# Patient Record
Sex: FEMALE | Race: AMERICAN INDIAN OR ALASKA NATIVE
[De-identification: names, ages, dates, MRNs, and addresses within clinical notes are randomized per-mention and may not be internally consistent; named-entity substitution may affect disease eponyms.]

---

## 2019-12-16 ENCOUNTER — HOSPITAL ENCOUNTER (INPATIENT)
Dept: HOSPITAL 5 - 3A | Age: 59
LOS: 7 days | Discharge: HOME HEALTH | DRG: 545 | End: 2019-12-23
Attending: INTERNAL MEDICINE | Admitting: INTERNAL MEDICINE
Payer: MEDICARE

## 2019-12-16 DIAGNOSIS — T45.515A: ICD-10-CM

## 2019-12-16 DIAGNOSIS — G89.4: ICD-10-CM

## 2019-12-16 DIAGNOSIS — M32.9: Primary | ICD-10-CM
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
## 2019-12-16 DIAGNOSIS — M19.90: ICD-10-CM

## 2019-12-16 DIAGNOSIS — R64: ICD-10-CM

## 2019-12-16 DIAGNOSIS — Z88.0: ICD-10-CM

## 2019-12-16 DIAGNOSIS — E43: ICD-10-CM

## 2019-12-16 DIAGNOSIS — Z86.718: ICD-10-CM

## 2019-12-16 DIAGNOSIS — Z91.013: ICD-10-CM

## 2019-12-16 DIAGNOSIS — Z88.6: ICD-10-CM

## 2019-12-16 DIAGNOSIS — E11.43: ICD-10-CM

## 2019-12-16 DIAGNOSIS — D64.9: ICD-10-CM

## 2019-12-16 DIAGNOSIS — K31.84: ICD-10-CM

## 2019-12-16 LAB
ALBUMIN SERPL-MCNC: 2.7 G/DL (ref 3.9–5)
ALT SERPL-CCNC: < 5 UNITS/L (ref 7–56)
BASOPHILS # (AUTO): 0 K/MM3 (ref 0–0.1)
BASOPHILS NFR BLD AUTO: 0.3 % (ref 0–1.8)
BUN SERPL-MCNC: 10 MG/DL (ref 7–17)
BUN/CREAT SERPL: 14 %
CALCIUM SERPL-MCNC: 8.7 MG/DL (ref 8.4–10.2)
EOSINOPHIL # BLD AUTO: 0.1 K/MM3 (ref 0–0.4)
EOSINOPHIL NFR BLD AUTO: 1.3 % (ref 0–4.3)
HCT VFR BLD CALC: 24.3 % (ref 30.3–42.9)
HEMOLYSIS INDEX: 13
HGB BLD-MCNC: 7.4 GM/DL (ref 10.1–14.3)
INR PPP: 8.32 (ref 0.87–1.13)
LYMPHOCYTES # BLD AUTO: 0.8 K/MM3 (ref 1.2–5.4)
LYMPHOCYTES NFR BLD AUTO: 11.6 % (ref 13.4–35)
MCHC RBC AUTO-ENTMCNC: 30 % (ref 30–34)
MCV RBC AUTO: 77 FL (ref 79–97)
MONOCYTES # (AUTO): 0.5 K/MM3 (ref 0–0.8)
MONOCYTES % (AUTO): 8.2 % (ref 0–7.3)
PLATELET # BLD: 277 K/MM3 (ref 140–440)
RBC # BLD AUTO: 3.17 M/MM3 (ref 3.65–5.03)

## 2019-12-16 PROCEDURE — 85025 COMPLETE CBC W/AUTO DIFF WBC: CPT

## 2019-12-16 PROCEDURE — 80053 COMPREHEN METABOLIC PANEL: CPT

## 2019-12-16 PROCEDURE — 85007 BL SMEAR W/DIFF WBC COUNT: CPT

## 2019-12-16 PROCEDURE — 85610 PROTHROMBIN TIME: CPT

## 2019-12-16 PROCEDURE — 82962 GLUCOSE BLOOD TEST: CPT

## 2019-12-16 PROCEDURE — 80048 BASIC METABOLIC PNL TOTAL CA: CPT

## 2019-12-16 PROCEDURE — 83540 ASSAY OF IRON: CPT

## 2019-12-16 PROCEDURE — 87116 MYCOBACTERIA CULTURE: CPT

## 2019-12-16 PROCEDURE — 36415 COLL VENOUS BLD VENIPUNCTURE: CPT

## 2019-12-16 PROCEDURE — A6250 SKIN SEAL PROTECT MOISTURIZR: HCPCS

## 2019-12-16 PROCEDURE — 74176 CT ABD & PELVIS W/O CONTRAST: CPT

## 2019-12-16 PROCEDURE — 82728 ASSAY OF FERRITIN: CPT

## 2019-12-16 RX ADMIN — MORPHINE SULFATE SCH: 30 TABLET, FILM COATED, EXTENDED RELEASE ORAL at 16:07

## 2019-12-16 RX ADMIN — LIDOCAINE HYDROCHLORIDE SCH ML: 20 SOLUTION ORAL; TOPICAL at 21:33

## 2019-12-16 RX ADMIN — HYDROMORPHONE HYDROCHLORIDE PRN MG: 1 INJECTION, SOLUTION INTRAMUSCULAR; INTRAVENOUS; SUBCUTANEOUS at 20:16

## 2019-12-16 RX ADMIN — DEXTROSE AND SODIUM CHLORIDE SCH MLS/HR: 5; .45 INJECTION, SOLUTION INTRAVENOUS at 17:02

## 2019-12-16 RX ADMIN — MORPHINE SULFATE SCH MG: 30 TABLET, FILM COATED, EXTENDED RELEASE ORAL at 21:37

## 2019-12-16 RX ADMIN — METHYLPREDNISOLONE SODIUM SUCCINATE SCH MG: 40 INJECTION, POWDER, FOR SOLUTION INTRAMUSCULAR; INTRAVENOUS at 21:33

## 2019-12-16 RX ADMIN — HYDROMORPHONE HYDROCHLORIDE PRN MG: 1 INJECTION, SOLUTION INTRAMUSCULAR; INTRAVENOUS; SUBCUTANEOUS at 16:12

## 2019-12-16 RX ADMIN — INSULIN HUMAN SCH UNITS: 100 INJECTION, SOLUTION PARENTERAL at 21:33

## 2019-12-16 RX ADMIN — LIDOCAINE HYDROCHLORIDE SCH: 20 SOLUTION ORAL; TOPICAL at 17:01

## 2019-12-16 RX ADMIN — Medication SCH ML: at 21:33

## 2019-12-16 RX ADMIN — INSULIN HUMAN SCH: 100 INJECTION, SOLUTION PARENTERAL at 17:01

## 2019-12-16 RX ADMIN — HYDROXYCHLOROQUINE SULFATE SCH MG: 200 TABLET ORAL at 21:33

## 2019-12-16 NOTE — HISTORY AND PHYSICAL REPORT
History of Present Illness


Date of examination: 12/16/19


Date of admission: 


12/16/19 13:56





Chief complaint: 





Generalized fatigue, intractable pain, Lupus flare.


History of present illness: 


Patient presented to the office , with CC of generalized weakness, diffuse joint

pains, in part, due to SLE flare up.Due to the fact, that patient was too weak, 

to manage this at home, she is now admitted to the hospital, for sxs 

management/control.She will get hydration, pain control, steroid.patient was on 

coumadin, for hx of DVT, and INR today, is overtherapeutic, and will be reversed

with Vit k, or FFP.








Past History


Past Medical History: anemia, arthritis, other (LUPUS.)


Social history: no significant social history, , lives with family


Family history: no significant family history





Medications and Allergies


                                    Allergies











Allergy/AdvReac Type Severity Reaction Status Date / Time


 


Penicillins Allergy Severe Shortness Verified 03/12/16 12:54





   of Breath  


 


codeine Allergy  Unknown Verified 03/12/16 12:54


 


shellfish derived Allergy  Unknown Verified 03/12/16 12:54











                                Home Medications











 Medication  Instructions  Recorded  Confirmed  Last Taken  Type


 


Hydroxychloroquine [Plaquenil] 200 mg PO BID 10/15/13 10/17/18 10/16/18 History


 


Metoprolol [Lopressor TAB] 100 mg PO BID 10/15/13 10/17/18 10/16/18 History


 


Sertraline [Zoloft] 100 mg PO QDAY 10/15/13 10/17/18 10/16/18 History


 


hydroCHLOROthiazide [HCTZ] 12.5 mg PO QDAY 10/15/13 10/17/18 10/16/18 History


 


Pantoprazole [Protonix TAB] 40 mg PO QDAY 02/20/14 10/17/18 10/16/18 History


 


Potassium Chloride [K-Dur] 10 meq PO DAILY 02/20/14 10/17/18 10/16/18 History


 


Metoclopramide [Reglan TAB] 10 mg PO TID #60 tablet 06/06/14 10/17/18 10/16/18 

Rx


 


Gabapentin [Gralise] 300 mg PO TID 03/04/16 10/17/18 10/16/18 History


 


Morphine ER [Ms Contin ER] 30 mg PO BID 03/04/16 10/17/18 10/16/18 History


 


Oxycodone HCl/Acetaminophen 10 mg PO Q6H PRN 03/04/16 10/17/18 10/16/18 History





[Percocet 10/325 mg]     


 


Ondansetron [Zofran Odt] 4 mg PO Q8H PRN #10 tab.rapdis 08/09/16 10/17/18 

10/16/18 Rx


 


oxyCODONE /ACETAMINOPHEN [Percocet 1 tab PO Q6HR PRN #20 tablet 12/16/16 

10/17/18 10/16/18 Rx





5/325]     


 


predniSONE [Deltasone] 15 mg PO DAILY 05/31/17 10/17/18 10/16/18 History


 


Warfarin [Coumadin] 5 mg PO QDAY 06/01/17 10/17/18 10/16/18 History


 


Oxycodone HCl/Acetaminophen 1 each PO Q6HR PRN #60 tablet 06/07/17 10/17/18 

10/16/18 Rx





[Percocet 10/325 mg]     











Active Meds: 


Active Medications





Dextrose (D50w (25gm) Syringe)  50 ml IV Q30MIN PRN; Protocol


   PRN Reason: Hypoglycemia


Folic Acid (Folvite)  1 mg PO QDAY MONIQUE


Hydromorphone HCl (Dilaudid)  2 mg IV Q4H PRN


   PRN Reason: Pain , Severe (7-10)


Hydroxychloroquine Sulfate (Plaquenil)  200 mg PO BID [facility]


Dextrose/Sodium Chloride (D5/0.45ns)  1,000 mls @ 125 mls/hr IV AS DIRECT MONIQUE


   Last Admin: 12/16/19 17:02 Dose:  125 mls/hr


   Documented by: 


Insulin Human Regular (Humulin R)  0 units SUB-Q ACHS [facility]; Protocol


   Last Admin: 12/16/19 17:01 Dose:  Not Given


   Documented by: 


Lidocaine HCl (Magic Mouthwash)  5 ml PO TID [facility]


   Last Admin: 12/16/19 17:01 Dose:  Not Given


   Documented by: 


Methylprednisolone Sodium Succinate (Solu-Medrol)  40 mg IV Q8HR [facility]


Metoclopramide HCl (Reglan)  10 mg PO TID [facility]


Miscellaneous Medication (Gabapentin [Gralise])  300 mg PO TID [facility]


Morphine Sulfate (Ms Contin Er)  30 mg PO Q12HR [facility]


   Last Admin: 12/16/19 16:07 Dose:  Not Given


   Documented by: 


Pantoprazole Sodium (Protonix)  40 mg PO QDAY MONIQUE


Sertraline HCl (Zoloft)  100 mg PO QDAY MONIQUE


Sodium Chloride (Sodium Chloride Flush Syringe 10 Ml)  10 ml IV BID MONIQUE


Sodium Chloride (Sodium Chloride Flush Syringe 10 Ml)  10 ml IV PRN PRN


   PRN Reason: LINE FLUSH











Review of Systems


Constitutional: weight loss, fatigue, weakness, poor appetite, chronic pain


Breasts: deferred


Psychiatric: anxiety, insomnia, depression





Exam





- Constitutional


Vitals: 


                                        











Temp Pulse Resp BP Pulse Ox


 


 98.3 F   80   19   113/76   96 


 


 12/16/19 16:31  12/16/19 16:31  12/16/19 16:31  12/16/19 16:31  12/16/19 16:31











General appearance: Present: mild distress, cachectic





- EENT


Eyes: Present: PERRL


ENT: hearing intact, clear oral mucosa





- Neck


Neck: Present: supple, normal ROM





- Respiratory


Respiratory effort: normal


Respiratory: bilateral: CTA





- Cardiovascular


Heart Sounds: Present: S1 & S2.  Absent: rub, click





- Extremities


Extremities: pulses symmetrical, No edema


Peripheral Pulses: within normal limits





- Abdominal


General gastrointestinal: Present: soft, non-tender, non-distended, normal bowel

 sounds


Female genitourinary: Present: deferred





- Rectal


Rectal Exam: deferred





- Integumentary


Integumentary: Present: clear, warm, dry





- Musculoskeletal


Musculoskeletal: gait normal, strength equal bilaterally





- Psychiatric


Psychiatric: appropriate mood/affect, intact judgment & insight





- Neurologic


Neurologic: CNII-XII intact, moves all extremities





Results





- Labs


CBC & Chem 7: 


                                 12/16/19 15:08





                                 12/16/19 15:08


Labs: 


                              Abnormal lab results











  12/16/19 12/16/19 12/16/19 Range/Units





  15:08 15:08 15:08 


 


RBC  3.17 L    (3.65-5.03)  M/mm3


 


Hgb  7.4 L    (10.1-14.3)  gm/dl


 


Hct  24.3 L    (30.3-42.9)  %


 


MCV  77 L    (79-97)  fl


 


MCH  23 L    (28-32)  pg


 


RDW  20.5 H    (13.2-15.2)  %


 


Lymph % (Auto)  11.6 L    (13.4-35.0)  %


 


Mono % (Auto)  8.2 H    (0.0-7.3)  %


 


Lymph #  0.8 L    (1.2-5.4)  K/mm3


 


Seg Neutrophils %  78.6 H    (40.0-70.0)  %


 


PT    71.0 H  (12.2-14.9)  Sec.


 


INR    8.32 H*  (0.87-1.13)  


 


Chloride   107.4 H   ()  mmol/L


 


ALT   < 5 L   (7-56)  units/L


 


Albumin   2.7 L   (3.9-5)  g/dL














Assessment and Plan





- Patient Problems


(1) Chronic pain syndrome


Current Visit: No   Status: Chronic   


Plan to address problem: 


pain control.








(2) Exacerbation of systemic lupus erythematosus


Current Visit: No   Status: Acute   


Plan to address problem: 


pulse steroid use.








(3) Gastroparesis diabeticorum


Current Visit: No   Status: Chronic   


Plan to address problem: 


supportive.








(4) Coumadin toxicity


Current Visit: Yes   Status: Acute   


Plan to address problem: 


will reverse.

## 2019-12-16 NOTE — CAT SCAN REPORT
CT abdomen pelvis wo con



INDICATION:

PELVIC PAIN.



TECHNIQUE:

All CT scans at this location are performed using CT dose reduction for ALARA by means of automated e
xposure control. 



COMPARISON:

3/5/2016



FINDINGS:

Lung bases are clear of acute disease. Moderate-sized hiatal hernia. Cholecystectomy. Liver, spleen, 
pancreas, kidneys and adrenals are grossly negative on this noncontrast exam. IVC filter in position.
 Abdominal aorta is normal in size. No adenopathy.



Pelvis



Urinary bladder and distal ureters are negative. Uterus is absent. 2.3 cm left ovarian cyst. No free 
fluid.



IMPRESSION:

1. 2.3 cm left ovarian cyst.

2. Otherwise, no acute abnormalities. 



Signer Name: Jim Marshall MD 

Signed: 12/16/2019 7:31 PM

 Workstation Name: [id number]

## 2019-12-17 LAB — INR PPP: 1.7 (ref 0.87–1.13)

## 2019-12-17 RX ADMIN — INSULIN HUMAN SCH UNITS: 100 INJECTION, SOLUTION PARENTERAL at 17:13

## 2019-12-17 RX ADMIN — DEXTROSE AND SODIUM CHLORIDE SCH MLS/HR: 5; .45 INJECTION, SOLUTION INTRAVENOUS at 20:41

## 2019-12-17 RX ADMIN — METHYLPREDNISOLONE SODIUM SUCCINATE SCH MG: 40 INJECTION, POWDER, FOR SOLUTION INTRAMUSCULAR; INTRAVENOUS at 13:02

## 2019-12-17 RX ADMIN — LIDOCAINE HYDROCHLORIDE SCH ML: 20 SOLUTION ORAL; TOPICAL at 20:42

## 2019-12-17 RX ADMIN — LIDOCAINE HYDROCHLORIDE SCH ML: 20 SOLUTION ORAL; TOPICAL at 08:58

## 2019-12-17 RX ADMIN — HYDROMORPHONE HYDROCHLORIDE PRN MG: 2 INJECTION, SOLUTION INTRAMUSCULAR; INTRAVENOUS; SUBCUTANEOUS at 01:24

## 2019-12-17 RX ADMIN — METOCLOPRAMIDE SCH MG: 10 TABLET ORAL at 08:57

## 2019-12-17 RX ADMIN — HYDROMORPHONE HYDROCHLORIDE PRN MG: 2 INJECTION, SOLUTION INTRAMUSCULAR; INTRAVENOUS; SUBCUTANEOUS at 22:15

## 2019-12-17 RX ADMIN — LIDOCAINE HYDROCHLORIDE SCH ML: 20 SOLUTION ORAL; TOPICAL at 13:03

## 2019-12-17 RX ADMIN — METOCLOPRAMIDE SCH MG: 10 TABLET ORAL at 20:43

## 2019-12-17 RX ADMIN — HYDROMORPHONE HYDROCHLORIDE PRN MG: 2 INJECTION, SOLUTION INTRAMUSCULAR; INTRAVENOUS; SUBCUTANEOUS at 05:56

## 2019-12-17 RX ADMIN — MORPHINE SULFATE SCH MG: 30 TABLET, FILM COATED, EXTENDED RELEASE ORAL at 09:17

## 2019-12-17 RX ADMIN — METHYLPREDNISOLONE SODIUM SUCCINATE SCH MG: 40 INJECTION, POWDER, FOR SOLUTION INTRAMUSCULAR; INTRAVENOUS at 05:56

## 2019-12-17 RX ADMIN — HYDROXYCHLOROQUINE SULFATE SCH MG: 200 TABLET ORAL at 21:11

## 2019-12-17 RX ADMIN — DEXTROSE AND SODIUM CHLORIDE SCH MLS/HR: 5; .45 INJECTION, SOLUTION INTRAVENOUS at 03:30

## 2019-12-17 RX ADMIN — SERTRALINE SCH MG: 100 TABLET, FILM COATED ORAL at 09:17

## 2019-12-17 RX ADMIN — HYDROMORPHONE HYDROCHLORIDE PRN MG: 2 INJECTION, SOLUTION INTRAMUSCULAR; INTRAVENOUS; SUBCUTANEOUS at 17:13

## 2019-12-17 RX ADMIN — GABAPENTIN SCH MG: 300 CAPSULE ORAL at 13:03

## 2019-12-17 RX ADMIN — PANTOPRAZOLE SODIUM SCH MG: 40 TABLET, DELAYED RELEASE ORAL at 09:18

## 2019-12-17 RX ADMIN — INSULIN HUMAN SCH: 100 INJECTION, SOLUTION PARENTERAL at 22:16

## 2019-12-17 RX ADMIN — METHYLPREDNISOLONE SODIUM SUCCINATE SCH MG: 40 INJECTION, POWDER, FOR SOLUTION INTRAMUSCULAR; INTRAVENOUS at 21:11

## 2019-12-17 RX ADMIN — MORPHINE SULFATE SCH MG: 30 TABLET, FILM COATED, EXTENDED RELEASE ORAL at 21:11

## 2019-12-17 RX ADMIN — Medication SCH ML: at 09:20

## 2019-12-17 RX ADMIN — DEXTROSE AND SODIUM CHLORIDE SCH MLS/HR: 5; .45 INJECTION, SOLUTION INTRAVENOUS at 12:20

## 2019-12-17 RX ADMIN — HYDROXYCHLOROQUINE SULFATE SCH MG: 200 TABLET ORAL at 09:18

## 2019-12-17 RX ADMIN — GABAPENTIN SCH MG: 300 CAPSULE ORAL at 20:43

## 2019-12-17 RX ADMIN — INSULIN HUMAN SCH UNITS: 100 INJECTION, SOLUTION PARENTERAL at 08:57

## 2019-12-17 RX ADMIN — GABAPENTIN SCH MG: 300 CAPSULE ORAL at 08:57

## 2019-12-17 RX ADMIN — FOLIC ACID SCH MG: 1 TABLET ORAL at 09:17

## 2019-12-17 RX ADMIN — METOCLOPRAMIDE SCH MG: 10 TABLET ORAL at 13:03

## 2019-12-17 RX ADMIN — INSULIN HUMAN SCH UNITS: 100 INJECTION, SOLUTION PARENTERAL at 13:02

## 2019-12-17 RX ADMIN — Medication SCH ML: at 21:12

## 2019-12-17 RX ADMIN — HYDROMORPHONE HYDROCHLORIDE PRN MG: 2 INJECTION, SOLUTION INTRAMUSCULAR; INTRAVENOUS; SUBCUTANEOUS at 10:29

## 2019-12-17 NOTE — PROGRESS NOTE
Assessment and Plan





- Patient Problems


(1) Chronic pain syndrome


Current Visit: No   Status: Chronic   


Plan to address problem: 


pain control.








(2) Exacerbation of systemic lupus erythematosus


Current Visit: Yes   Status: Acute   


Plan to address problem: 


pulse steroid use.








(3) Gastroparesis diabeticorum


Current Visit: Yes   Status: Chronic   


Plan to address problem: 


supportive.








(4) Coumadin toxicity


Current Visit: Yes   Status: Acute   


Plan to address problem: 


will reverse.


reversed.








(5) Cachexia


Current Visit: Yes   Status: Acute   


Plan to address problem: 


with protien calore malnurishment, present on admission, and will continue with 

support.








Subjective


Date of service: 12/17/19


Principal diagnosis: Dehydration/generalized weakness,poor nutrition.


Interval history: 


Patient seen/examined, resting in bed, labs reviewed, case d/w patient.Will 

continue with current management.








Objective





- Constitutional


Vitals: 


                               Vital Signs - 12hr











  12/17/19 12/17/19 12/17/19





  10:00 11:51 16:47


 


Temperature  98.3 F 98.4 F


 


Pulse Rate  64 75


 


Respiratory  18 18





Rate   


 


Blood Pressure  111/70 117/76


 


O2 Sat by Pulse 96 95 94





Oximetry   











General appearance: Present: mild distress, cachectic, other (protien calore 

malnurishment with cachectia, present on admission.)





- EENT


Eyes: PERRL, EOM intact


ENT: hearing intact, clear oral mucosa


Ears: bilateral: normal





- Neck


Neck: supple, normal ROM





- Respiratory


Respiratory effort: normal


Respiratory: bilateral: CTA





- Breasts


Breasts: deferred





- Cardiovascular


Rhythm: regular


Heart Sounds: Present: S1 & S2.  Absent: gallop, rub


Extremities: pulses intact, No edema, normal color, Full ROM





- Gastrointestinal


General gastrointestinal: Present: soft, non-tender, non-distended, normal bowel

sounds


Rectal Exam: deferred





- Genitourinary


Female genitourinary: deferred





- Integumentary


Integumentary: clear, warm, dry





- Musculoskeletal


Musculoskeletal: 1, strength equal bilaterally





- Neurologic


Neurologic: moves all extremities





- Psychiatric


Psychiatric: memory intact, appropriate mood/affect, intact judgment & insight





- Labs


CBC & Chem 7: 


                                 12/16/19 15:08





                                 12/16/19 15:08


Labs: 


                              Abnormal lab results











  12/16/19 12/17/19 12/17/19 Range/Units





  21:19 06:00 06:00 


 


PT   20.3 H   (12.2-14.9)  Sec.


 


INR   1.70 H   (0.87-1.13)  


 


POC Glucose  172 H    ()  


 


Iron    14 L  ()  ug/dL














  12/17/19 12/17/19 12/17/19 Range/Units





  07:36 11:47 16:39 


 


PT     (12.2-14.9)  Sec.


 


INR     (0.87-1.13)  


 


POC Glucose  253 H  165 H  172 H  ()  


 


Iron     ()  ug/dL

## 2019-12-18 LAB — INR PPP: 1.44 (ref 0.87–1.13)

## 2019-12-18 RX ADMIN — METHYLPREDNISOLONE SODIUM SUCCINATE SCH MG: 40 INJECTION, POWDER, FOR SOLUTION INTRAMUSCULAR; INTRAVENOUS at 14:12

## 2019-12-18 RX ADMIN — HYDROMORPHONE HYDROCHLORIDE PRN MG: 2 INJECTION, SOLUTION INTRAMUSCULAR; INTRAVENOUS; SUBCUTANEOUS at 09:00

## 2019-12-18 RX ADMIN — SODIUM CHLORIDE SCH MLS/HR: 0.45 INJECTION, SOLUTION INTRAVENOUS at 22:46

## 2019-12-18 RX ADMIN — HYDROMORPHONE HYDROCHLORIDE PRN MG: 2 INJECTION, SOLUTION INTRAMUSCULAR; INTRAVENOUS; SUBCUTANEOUS at 04:01

## 2019-12-18 RX ADMIN — DEXTROSE AND SODIUM CHLORIDE SCH MLS/HR: 5; .45 INJECTION, SOLUTION INTRAVENOUS at 04:01

## 2019-12-18 RX ADMIN — MORPHINE SULFATE SCH MG: 30 TABLET, FILM COATED, EXTENDED RELEASE ORAL at 12:43

## 2019-12-18 RX ADMIN — LIDOCAINE HYDROCHLORIDE SCH ML: 20 SOLUTION ORAL; TOPICAL at 09:00

## 2019-12-18 RX ADMIN — INSULIN HUMAN SCH UNITS: 100 INJECTION, SOLUTION PARENTERAL at 22:46

## 2019-12-18 RX ADMIN — HYDROMORPHONE HYDROCHLORIDE PRN MG: 2 INJECTION, SOLUTION INTRAMUSCULAR; INTRAVENOUS; SUBCUTANEOUS at 18:10

## 2019-12-18 RX ADMIN — GABAPENTIN SCH MG: 300 CAPSULE ORAL at 21:03

## 2019-12-18 RX ADMIN — HYDROMORPHONE HYDROCHLORIDE PRN MG: 2 INJECTION, SOLUTION INTRAMUSCULAR; INTRAVENOUS; SUBCUTANEOUS at 22:45

## 2019-12-18 RX ADMIN — INSULIN HUMAN SCH UNITS: 100 INJECTION, SOLUTION PARENTERAL at 12:40

## 2019-12-18 RX ADMIN — METOCLOPRAMIDE SCH MG: 10 TABLET ORAL at 09:00

## 2019-12-18 RX ADMIN — Medication SCH ML: at 21:04

## 2019-12-18 RX ADMIN — FOLIC ACID SCH MG: 1 TABLET ORAL at 12:50

## 2019-12-18 RX ADMIN — METOCLOPRAMIDE SCH MG: 10 TABLET ORAL at 21:03

## 2019-12-18 RX ADMIN — INSULIN HUMAN SCH UNITS: 100 INJECTION, SOLUTION PARENTERAL at 18:08

## 2019-12-18 RX ADMIN — LIDOCAINE HYDROCHLORIDE SCH ML: 20 SOLUTION ORAL; TOPICAL at 22:45

## 2019-12-18 RX ADMIN — MORPHINE SULFATE SCH MG: 30 TABLET, FILM COATED, EXTENDED RELEASE ORAL at 21:04

## 2019-12-18 RX ADMIN — GABAPENTIN SCH MG: 300 CAPSULE ORAL at 14:12

## 2019-12-18 RX ADMIN — LIDOCAINE HYDROCHLORIDE SCH ML: 20 SOLUTION ORAL; TOPICAL at 14:12

## 2019-12-18 RX ADMIN — METHYLPREDNISOLONE SODIUM SUCCINATE SCH MG: 40 INJECTION, POWDER, FOR SOLUTION INTRAMUSCULAR; INTRAVENOUS at 21:04

## 2019-12-18 RX ADMIN — GABAPENTIN SCH MG: 300 CAPSULE ORAL at 08:59

## 2019-12-18 RX ADMIN — INSULIN HUMAN SCH UNITS: 100 INJECTION, SOLUTION PARENTERAL at 08:54

## 2019-12-18 RX ADMIN — METOCLOPRAMIDE SCH MG: 10 TABLET ORAL at 14:12

## 2019-12-18 RX ADMIN — HYDROMORPHONE HYDROCHLORIDE PRN MG: 2 INJECTION, SOLUTION INTRAMUSCULAR; INTRAVENOUS; SUBCUTANEOUS at 14:11

## 2019-12-18 RX ADMIN — HYDROXYCHLOROQUINE SULFATE SCH MG: 200 TABLET ORAL at 12:43

## 2019-12-18 RX ADMIN — Medication SCH ML: at 12:51

## 2019-12-18 RX ADMIN — SERTRALINE SCH MG: 100 TABLET, FILM COATED ORAL at 12:42

## 2019-12-18 RX ADMIN — HYDROXYCHLOROQUINE SULFATE SCH MG: 200 TABLET ORAL at 21:03

## 2019-12-18 RX ADMIN — PANTOPRAZOLE SODIUM SCH MG: 40 TABLET, DELAYED RELEASE ORAL at 12:42

## 2019-12-18 RX ADMIN — METHYLPREDNISOLONE SODIUM SUCCINATE SCH MG: 40 INJECTION, POWDER, FOR SOLUTION INTRAMUSCULAR; INTRAVENOUS at 06:01

## 2019-12-18 NOTE — PROGRESS NOTE
Assessment and Plan





- Patient Problems


(1) Chronic pain syndrome


Current Visit: No   Status: Chronic   


Plan to address problem: 


pain control.








(2) Exacerbation of systemic lupus erythematosus


Current Visit: Yes   Status: Acute   


Plan to address problem: 


pulse steroid use.








(3) Gastroparesis diabeticorum


Current Visit: Yes   Status: Chronic   


Plan to address problem: 


supportive.








(4) Coumadin toxicity


Current Visit: Yes   Status: Acute   


Plan to address problem: 


will reverse.


reversed.








(5) Cachexia


Current Visit: Yes   Status: Acute   


Plan to address problem: 


with eliazar joye malnurishment, present on admission, and will continue with 

support.








Subjective


Date of service: 12/18/19


Principal diagnosis: Dehydration/generalized weakness,poor nutrition.


Interval history: 


Patient seen/examined, resting in bed, labs reviewed, case d/w patient.Will 

continue with current management.


Patient seen/examined, resting in bed,, still in pain, and weakness.





Objective





- Constitutional


Vitals: 


                               Vital Signs - 12hr











  12/18/19 12/18/19





  08:38 11:01


 


Temperature  97.3 F L


 


Pulse Rate  63


 


Respiratory  20





Rate  


 


Blood Pressure  111/71


 


O2 Sat by Pulse 94 95





Oximetry  











General appearance: Present: mild distress, cachectic





- EENT


Eyes: PERRL, EOM intact


ENT: hearing intact, clear oral mucosa


Ears: bilateral: normal





- Neck


Neck: supple, normal ROM





- Respiratory


Respiratory effort: normal


Respiratory: bilateral: CTA





- Breasts


Breasts: deferred





- Cardiovascular


Rhythm: regular


Heart Sounds: Present: S1 & S2.  Absent: gallop, rub


Extremities: pulses intact, No edema, normal color, Full ROM





- Gastrointestinal


General gastrointestinal: Present: soft, non-tender, non-distended, normal bowel

sounds


Rectal Exam: deferred





- Genitourinary


Female genitourinary: deferred





- Integumentary


Integumentary: clear, warm, dry





- Musculoskeletal


Musculoskeletal: 1, strength equal bilaterally





- Neurologic


Neurologic: moves all extremities





- Psychiatric


Psychiatric: memory intact, appropriate mood/affect, intact judgment & insight





- Labs


CBC & Chem 7: 


                                 12/16/19 15:08





                                 12/16/19 15:08


Labs: 


                              Abnormal lab results











  12/17/19 12/18/19 12/18/19 Range/Units





  21:34 08:20 09:14 


 


PT    17.8 H  (12.2-14.9)  Sec.


 


INR    1.44 H  (0.87-1.13)  


 


POC Glucose  237 H  404 H   ()  














  12/18/19 12/18/19 Range/Units





  11:21 16:57 


 


PT    (12.2-14.9)  Sec.


 


INR    (0.87-1.13)  


 


POC Glucose  163 H  182 H  ()

## 2019-12-19 LAB — INR PPP: 2.16 (ref 0.87–1.13)

## 2019-12-19 RX ADMIN — INSULIN HUMAN SCH UNITS: 100 INJECTION, SOLUTION PARENTERAL at 18:00

## 2019-12-19 RX ADMIN — Medication PRN ML: at 03:31

## 2019-12-19 RX ADMIN — MORPHINE SULFATE SCH MG: 30 TABLET, FILM COATED, EXTENDED RELEASE ORAL at 21:28

## 2019-12-19 RX ADMIN — HYDROMORPHONE HYDROCHLORIDE PRN MG: 2 INJECTION, SOLUTION INTRAMUSCULAR; INTRAVENOUS; SUBCUTANEOUS at 19:50

## 2019-12-19 RX ADMIN — LIDOCAINE HYDROCHLORIDE SCH ML: 20 SOLUTION ORAL; TOPICAL at 13:32

## 2019-12-19 RX ADMIN — INSULIN HUMAN SCH UNITS: 100 INJECTION, SOLUTION PARENTERAL at 13:12

## 2019-12-19 RX ADMIN — INSULIN HUMAN SCH: 100 INJECTION, SOLUTION PARENTERAL at 08:13

## 2019-12-19 RX ADMIN — METHYLPREDNISOLONE SODIUM SUCCINATE SCH MG: 40 INJECTION, POWDER, FOR SOLUTION INTRAMUSCULAR; INTRAVENOUS at 21:29

## 2019-12-19 RX ADMIN — MORPHINE SULFATE SCH MG: 30 TABLET, FILM COATED, EXTENDED RELEASE ORAL at 10:05

## 2019-12-19 RX ADMIN — Medication SCH ML: at 10:05

## 2019-12-19 RX ADMIN — FOLIC ACID SCH MG: 1 TABLET ORAL at 10:05

## 2019-12-19 RX ADMIN — LIDOCAINE HYDROCHLORIDE SCH ML: 20 SOLUTION ORAL; TOPICAL at 08:28

## 2019-12-19 RX ADMIN — SODIUM CHLORIDE SCH MLS/HR: 0.45 INJECTION, SOLUTION INTRAVENOUS at 21:26

## 2019-12-19 RX ADMIN — METOCLOPRAMIDE SCH MG: 10 TABLET ORAL at 19:55

## 2019-12-19 RX ADMIN — SODIUM CHLORIDE SCH MLS/HR: 0.45 INJECTION, SOLUTION INTRAVENOUS at 13:11

## 2019-12-19 RX ADMIN — Medication SCH ML: at 21:28

## 2019-12-19 RX ADMIN — SERTRALINE SCH MG: 100 TABLET, FILM COATED ORAL at 10:05

## 2019-12-19 RX ADMIN — LIDOCAINE HYDROCHLORIDE SCH ML: 20 SOLUTION ORAL; TOPICAL at 19:55

## 2019-12-19 RX ADMIN — HYDROXYCHLOROQUINE SULFATE SCH MG: 200 TABLET ORAL at 10:05

## 2019-12-19 RX ADMIN — GABAPENTIN SCH MG: 300 CAPSULE ORAL at 08:28

## 2019-12-19 RX ADMIN — HYDROMORPHONE HYDROCHLORIDE PRN MG: 2 INJECTION, SOLUTION INTRAMUSCULAR; INTRAVENOUS; SUBCUTANEOUS at 07:02

## 2019-12-19 RX ADMIN — HYDROMORPHONE HYDROCHLORIDE PRN MG: 2 INJECTION, SOLUTION INTRAMUSCULAR; INTRAVENOUS; SUBCUTANEOUS at 11:20

## 2019-12-19 RX ADMIN — GABAPENTIN SCH MG: 300 CAPSULE ORAL at 19:54

## 2019-12-19 RX ADMIN — SODIUM CHLORIDE SCH MLS/HR: 0.45 INJECTION, SOLUTION INTRAVENOUS at 05:48

## 2019-12-19 RX ADMIN — Medication PRN ML: at 05:48

## 2019-12-19 RX ADMIN — METHYLPREDNISOLONE SODIUM SUCCINATE SCH MG: 40 INJECTION, POWDER, FOR SOLUTION INTRAMUSCULAR; INTRAVENOUS at 13:12

## 2019-12-19 RX ADMIN — METHYLPREDNISOLONE SODIUM SUCCINATE SCH MG: 40 INJECTION, POWDER, FOR SOLUTION INTRAMUSCULAR; INTRAVENOUS at 05:48

## 2019-12-19 RX ADMIN — PANTOPRAZOLE SODIUM SCH MG: 40 TABLET, DELAYED RELEASE ORAL at 10:04

## 2019-12-19 RX ADMIN — INSULIN HUMAN SCH UNITS: 100 INJECTION, SOLUTION PARENTERAL at 22:56

## 2019-12-19 RX ADMIN — HYDROXYCHLOROQUINE SULFATE SCH MG: 200 TABLET ORAL at 21:29

## 2019-12-19 RX ADMIN — METOCLOPRAMIDE SCH MG: 10 TABLET ORAL at 08:28

## 2019-12-19 RX ADMIN — HYDROMORPHONE HYDROCHLORIDE PRN MG: 2 INJECTION, SOLUTION INTRAMUSCULAR; INTRAVENOUS; SUBCUTANEOUS at 03:31

## 2019-12-19 RX ADMIN — HYDROMORPHONE HYDROCHLORIDE PRN MG: 2 INJECTION, SOLUTION INTRAMUSCULAR; INTRAVENOUS; SUBCUTANEOUS at 15:37

## 2019-12-19 RX ADMIN — GABAPENTIN SCH MG: 300 CAPSULE ORAL at 13:12

## 2019-12-19 RX ADMIN — METOCLOPRAMIDE SCH MG: 10 TABLET ORAL at 13:12

## 2019-12-19 NOTE — PROGRESS NOTE
Assessment and Plan





- Patient Problems


(1) Chronic pain syndrome


Current Visit: No   Status: Chronic   


Plan to address problem: 


pain control.








(2) Exacerbation of systemic lupus erythematosus


Current Visit: Yes   Status: Acute   


Plan to address problem: 


pulse steroid use.








(3) Gastroparesis diabeticorum


Current Visit: Yes   Status: Chronic   


Plan to address problem: 


supportive.








(4) Coumadin toxicity


Current Visit: Yes   Status: Acute   


Plan to address problem: 


will reverse.


reversed.








(5) Cachexia


Current Visit: Yes   Status: Acute   


Plan to address problem: 


with protien calore malnurishment, present on admission, and will continue with 

support.








Subjective


Date of service: 12/19/19


Principal diagnosis: Dehydration/generalized weakness,poor nutrition.


Interval history: 


Patient seen/examined, resting in bed, labs reviewed, case d/w patient.Will 

continue with current management.


Patient seen/examined, resting in bed,, still in pain, and weakness.


patient seen/examined, resting in bed, records reviewed, case d/w patient. Ct of

the abd/pelvics with ovarian cystic lesion.patient c/o slight improvement , 

overall.will continue , with current management , for couple more days, and 

hernan down steroid, as she prepares to go home.





Objective





- Constitutional


Vitals: 


                               Vital Signs - 12hr











  12/19/19 12/19/19 12/19/19





  11:37 17:25 21:33


 


Temperature 98.3 F 98.3 F 


 


Pulse Rate 65 70 


 


Respiratory 22 22 





Rate   


 


Blood Pressure 127/81 107/69 


 


O2 Sat by Pulse 94 95 95





Oximetry   











General appearance: Present: mild distress, cachectic





- EENT


Eyes: PERRL, EOM intact


ENT: hearing intact, clear oral mucosa


Ears: bilateral: normal





- Neck


Neck: supple, normal ROM





- Respiratory


Respiratory effort: normal


Respiratory: bilateral: CTA





- Breasts


Breasts: deferred





- Cardiovascular


Rhythm: regular


Heart Sounds: Present: S1 & S2.  Absent: gallop, rub


Extremities: pulses intact, No edema, normal color, Full ROM





- Gastrointestinal


General gastrointestinal: Present: soft, non-tender, non-distended, normal bowel

sounds


Rectal Exam: deferred





- Genitourinary


Female genitourinary: deferred





- Integumentary


Integumentary: clear, warm, dry





- Musculoskeletal


Musculoskeletal: 1, strength equal bilaterally





- Neurologic


Neurologic: moves all extremities





- Psychiatric


Psychiatric: memory intact, appropriate mood/affect, intact judgment & insight





- Labs


CBC & Chem 7: 


                                 12/16/19 15:08





                                 12/16/19 15:08


Labs: 


                              Abnormal lab results











  12/18/19 12/19/19 12/19/19 Range/Units





  22:05 06:05 08:06 


 


PT   24.5 H   (12.2-14.9)  Sec.


 


INR   2.16 H   (0.87-1.13)  


 


POC Glucose  167 H   128 H  ()  














  12/19/19 12/19/19 Range/Units





  11:45 17:37 


 


PT    (12.2-14.9)  Sec.


 


INR    (0.87-1.13)  


 


POC Glucose  250 H  169 H  ()

## 2019-12-20 LAB
BAND NEUTROPHILS # (MANUAL): 0 K/MM3
BUN SERPL-MCNC: 26 MG/DL (ref 7–17)
BUN/CREAT SERPL: 37 %
CALCIUM SERPL-MCNC: 7.8 MG/DL (ref 8.4–10.2)
DACRYOCYTES BLD QL SMEAR: (no result)
HCT VFR BLD CALC: 29.3 % (ref 30.3–42.9)
HEMOLYSIS INDEX: 1
HGB BLD-MCNC: 9 GM/DL (ref 10.1–14.3)
INR PPP: 2.92 (ref 0.87–1.13)
MCHC RBC AUTO-ENTMCNC: 31 % (ref 30–34)
MCV RBC AUTO: 78 FL (ref 79–97)
MYELOCYTES # (MANUAL): 0 K/MM3
PLATELET # BLD: 299 K/MM3 (ref 140–440)
PROMYELOCYTES # (MANUAL): 0 K/MM3
RBC # BLD AUTO: 3.76 M/MM3 (ref 3.65–5.03)
TOTAL CELLS COUNTED BLD: 100

## 2019-12-20 RX ADMIN — INSULIN HUMAN SCH: 100 INJECTION, SOLUTION PARENTERAL at 22:37

## 2019-12-20 RX ADMIN — GABAPENTIN SCH MG: 300 CAPSULE ORAL at 21:29

## 2019-12-20 RX ADMIN — HYDROMORPHONE HYDROCHLORIDE PRN MG: 2 INJECTION, SOLUTION INTRAMUSCULAR; INTRAVENOUS; SUBCUTANEOUS at 04:06

## 2019-12-20 RX ADMIN — GABAPENTIN SCH MG: 300 CAPSULE ORAL at 07:49

## 2019-12-20 RX ADMIN — HYDROXYCHLOROQUINE SULFATE SCH MG: 200 TABLET ORAL at 21:30

## 2019-12-20 RX ADMIN — INSULIN HUMAN SCH: 100 INJECTION, SOLUTION PARENTERAL at 07:47

## 2019-12-20 RX ADMIN — HYDROXYCHLOROQUINE SULFATE SCH MG: 200 TABLET ORAL at 10:10

## 2019-12-20 RX ADMIN — FOLIC ACID SCH MG: 1 TABLET ORAL at 10:10

## 2019-12-20 RX ADMIN — METHYLPREDNISOLONE SODIUM SUCCINATE SCH MG: 40 INJECTION, POWDER, FOR SOLUTION INTRAMUSCULAR; INTRAVENOUS at 13:12

## 2019-12-20 RX ADMIN — LIDOCAINE HYDROCHLORIDE SCH ML: 20 SOLUTION ORAL; TOPICAL at 07:48

## 2019-12-20 RX ADMIN — PANTOPRAZOLE SODIUM SCH MG: 40 TABLET, DELAYED RELEASE ORAL at 10:10

## 2019-12-20 RX ADMIN — METOCLOPRAMIDE SCH MG: 10 TABLET ORAL at 21:30

## 2019-12-20 RX ADMIN — HYDROMORPHONE HYDROCHLORIDE PRN MG: 2 INJECTION, SOLUTION INTRAMUSCULAR; INTRAVENOUS; SUBCUTANEOUS at 21:25

## 2019-12-20 RX ADMIN — HYDROMORPHONE HYDROCHLORIDE PRN MG: 2 INJECTION, SOLUTION INTRAMUSCULAR; INTRAVENOUS; SUBCUTANEOUS at 00:03

## 2019-12-20 RX ADMIN — HYDROMORPHONE HYDROCHLORIDE PRN MG: 2 INJECTION, SOLUTION INTRAMUSCULAR; INTRAVENOUS; SUBCUTANEOUS at 17:05

## 2019-12-20 RX ADMIN — MORPHINE SULFATE SCH MG: 30 TABLET, FILM COATED, EXTENDED RELEASE ORAL at 21:29

## 2019-12-20 RX ADMIN — SODIUM CHLORIDE SCH MLS/HR: 0.45 INJECTION, SOLUTION INTRAVENOUS at 13:12

## 2019-12-20 RX ADMIN — Medication SCH ML: at 21:31

## 2019-12-20 RX ADMIN — LIDOCAINE HYDROCHLORIDE SCH ML: 20 SOLUTION ORAL; TOPICAL at 13:12

## 2019-12-20 RX ADMIN — Medication SCH ML: at 10:11

## 2019-12-20 RX ADMIN — LIDOCAINE HYDROCHLORIDE SCH ML: 20 SOLUTION ORAL; TOPICAL at 21:54

## 2019-12-20 RX ADMIN — METHYLPREDNISOLONE SODIUM SUCCINATE SCH MG: 40 INJECTION, POWDER, FOR SOLUTION INTRAMUSCULAR; INTRAVENOUS at 05:27

## 2019-12-20 RX ADMIN — METOCLOPRAMIDE SCH MG: 10 TABLET ORAL at 13:12

## 2019-12-20 RX ADMIN — SERTRALINE SCH MG: 100 TABLET, FILM COATED ORAL at 10:11

## 2019-12-20 RX ADMIN — HYDROMORPHONE HYDROCHLORIDE PRN MG: 2 INJECTION, SOLUTION INTRAMUSCULAR; INTRAVENOUS; SUBCUTANEOUS at 12:42

## 2019-12-20 RX ADMIN — INSULIN HUMAN SCH UNITS: 100 INJECTION, SOLUTION PARENTERAL at 12:38

## 2019-12-20 RX ADMIN — MORPHINE SULFATE SCH MG: 30 TABLET, FILM COATED, EXTENDED RELEASE ORAL at 10:10

## 2019-12-20 RX ADMIN — METOCLOPRAMIDE SCH MG: 10 TABLET ORAL at 07:49

## 2019-12-20 RX ADMIN — HYDROMORPHONE HYDROCHLORIDE PRN MG: 2 INJECTION, SOLUTION INTRAMUSCULAR; INTRAVENOUS; SUBCUTANEOUS at 08:15

## 2019-12-20 RX ADMIN — INSULIN HUMAN SCH UNITS: 100 INJECTION, SOLUTION PARENTERAL at 17:05

## 2019-12-20 RX ADMIN — SODIUM CHLORIDE SCH MLS/HR: 0.45 INJECTION, SOLUTION INTRAVENOUS at 21:31

## 2019-12-20 RX ADMIN — SODIUM CHLORIDE SCH MLS/HR: 0.45 INJECTION, SOLUTION INTRAVENOUS at 05:27

## 2019-12-20 RX ADMIN — METHYLPREDNISOLONE SODIUM SUCCINATE SCH MG: 40 INJECTION, POWDER, FOR SOLUTION INTRAMUSCULAR; INTRAVENOUS at 21:29

## 2019-12-20 RX ADMIN — GABAPENTIN SCH MG: 300 CAPSULE ORAL at 13:12

## 2019-12-20 NOTE — PROGRESS NOTE
Assessment and Plan





- Patient Problems


(1) Chronic pain syndrome


Current Visit: No   Status: Chronic   


Plan to address problem: 


pain control.








(2) Exacerbation of systemic lupus erythematosus


Current Visit: Yes   Status: Acute   


Plan to address problem: 


pulse steroid use.








(3) Gastroparesis diabeticorum


Current Visit: Yes   Status: Chronic   


Plan to address problem: 


supportive.








(4) Coumadin toxicity


Current Visit: Yes   Status: Acute   


Plan to address problem: 


will reverse.


reversed.








(5) Cachexia


Current Visit: Yes   Status: Acute   


Plan to address problem: 


with eliazar joye malnurishment, present on admission, and will continue with 

support.








Subjective


Date of service: 12/20/19


Principal diagnosis: Dehydration/generalized weakness,poor nutrition.


Interval history: 


Patient seen/examined, resting in bed, labs reviewed, case d/w patient.Will 

continue with current management.


Patient seen/examined, resting in bed,, still in pain, and weakness.


patient seen/examined, resting in bed, records reviewed, case d/w patient. Ct of

the abd/pelvics with ovarian cystic lesion.patient c/o slight improvement , 

overall.will continue , with current management , for couple more days, and 

hernan down steroid, as she prepares to go home.


Patient seen/examined, resting in bed, records reviewed, case d/w patient. Will 

plan D/c this weekend.





Objective





- Constitutional


Vitals: 


                               Vital Signs - 12hr











  12/20/19 12/20/19 12/20/19





  06:21 11:50 15:36


 


Temperature 98.1 F 98.3 F 98.0 F


 


Pulse Rate 64 76 80


 


Respiratory 18 20 18





Rate   


 


Blood Pressure 135/83 124/88 111/72


 


O2 Sat by Pulse 94 97 94





Oximetry   











General appearance: Present: mild distress, well-nourished





- EENT


Eyes: PERRL, EOM intact


ENT: hearing intact, clear oral mucosa


Ears: bilateral: normal





- Neck


Neck: supple, normal ROM





- Respiratory


Respiratory effort: normal


Respiratory: bilateral: CTA





- Breasts


Breasts: deferred





- Cardiovascular


Rhythm: regular


Heart Sounds: Present: S1 & S2.  Absent: gallop, rub


Extremities: pulses intact, No edema, normal color, Full ROM





- Gastrointestinal


General gastrointestinal: Present: soft, non-tender, non-distended, normal bowel

sounds


Rectal Exam: deferred





- Genitourinary


Female genitourinary: deferred





- Integumentary


Integumentary: clear, warm, dry





- Musculoskeletal


Musculoskeletal: 1, strength equal bilaterally





- Neurologic


Neurologic: moves all extremities





- Psychiatric


Psychiatric: memory intact, appropriate mood/affect, intact judgment & insight





- Labs


CBC & Chem 7: 


                                 12/20/19 08:00





                                 12/20/19 08:00


Labs: 


                              Abnormal lab results











  12/19/19 12/19/19 12/20/19 Range/Units





  17:37 22:14 07:46 


 


Hgb     (10.1-14.3)  gm/dl


 


Hct     (30.3-42.9)  %


 


MCV     (79-97)  fl


 


MCH     (28-32)  pg


 


RDW     (13.2-15.2)  %


 


Seg Neuts % (Manual)     (40.0-70.0)  %


 


Lymphocytes % (Manual)     (13.4-35.0)  %


 


Seg Neutrophils # Man     (1.8-7.7)  K/mm3


 


Lymphocytes # (Manual)     (1.2-5.4)  K/mm3


 


PT     (12.2-14.9)  Sec.


 


INR     (0.87-1.13)  


 


Chloride     ()  mmol/L


 


Carbon Dioxide     (22-30)  mmol/L


 


BUN     (7-17)  mg/dL


 


Glucose     ()  mg/dL


 


POC Glucose  169 H  150 H  120 H  ()  


 


Calcium     (8.4-10.2)  mg/dL














  12/20/19 12/20/19 12/20/19 Range/Units





  08:00 08:00 08:00 


 


Hgb   9.0 L   (10.1-14.3)  gm/dl


 


Hct   29.3 L   (30.3-42.9)  %


 


MCV   78 L   (79-97)  fl


 


MCH   24 L   (28-32)  pg


 


RDW   21.8 H   (13.2-15.2)  %


 


Seg Neuts % (Manual)   95.0 H   (40.0-70.0)  %


 


Lymphocytes % (Manual)   4.0 L   (13.4-35.0)  %


 


Seg Neutrophils # Man   8.9 H   (1.8-7.7)  K/mm3


 


Lymphocytes # (Manual)   0.4 L   (1.2-5.4)  K/mm3


 


PT  31.1 H    (12.2-14.9)  Sec.


 


INR  2.92 H    (0.87-1.13)  


 


Chloride    107.2 H  ()  mmol/L


 


Carbon Dioxide    21 L  (22-30)  mmol/L


 


BUN    26 H  (7-17)  mg/dL


 


Glucose    118 H  ()  mg/dL


 


POC Glucose     ()  


 


Calcium    7.8 L  (8.4-10.2)  mg/dL














  12/20/19 12/20/19 Range/Units





  11:57 16:56 


 


Hgb    (10.1-14.3)  gm/dl


 


Hct    (30.3-42.9)  %


 


MCV    (79-97)  fl


 


MCH    (28-32)  pg


 


RDW    (13.2-15.2)  %


 


Seg Neuts % (Manual)    (40.0-70.0)  %


 


Lymphocytes % (Manual)    (13.4-35.0)  %


 


Seg Neutrophils # Man    (1.8-7.7)  K/mm3


 


Lymphocytes # (Manual)    (1.2-5.4)  K/mm3


 


PT    (12.2-14.9)  Sec.


 


INR    (0.87-1.13)  


 


Chloride    ()  mmol/L


 


Carbon Dioxide    (22-30)  mmol/L


 


BUN    (7-17)  mg/dL


 


Glucose    ()  mg/dL


 


POC Glucose  289 H  163 H  ()  


 


Calcium    (8.4-10.2)  mg/dL

## 2019-12-21 LAB — INR PPP: 4.09 (ref 0.87–1.13)

## 2019-12-21 RX ADMIN — SERTRALINE SCH MG: 100 TABLET, FILM COATED ORAL at 10:08

## 2019-12-21 RX ADMIN — GABAPENTIN SCH MG: 300 CAPSULE ORAL at 10:12

## 2019-12-21 RX ADMIN — HYDROMORPHONE HYDROCHLORIDE PRN MG: 2 INJECTION, SOLUTION INTRAMUSCULAR; INTRAVENOUS; SUBCUTANEOUS at 18:07

## 2019-12-21 RX ADMIN — HYDROMORPHONE HYDROCHLORIDE PRN MG: 2 INJECTION, SOLUTION INTRAMUSCULAR; INTRAVENOUS; SUBCUTANEOUS at 22:43

## 2019-12-21 RX ADMIN — METHYLPREDNISOLONE SODIUM SUCCINATE SCH MG: 40 INJECTION, POWDER, FOR SOLUTION INTRAMUSCULAR; INTRAVENOUS at 21:29

## 2019-12-21 RX ADMIN — FOLIC ACID SCH MG: 1 TABLET ORAL at 10:09

## 2019-12-21 RX ADMIN — HYDROXYCHLOROQUINE SULFATE SCH MG: 200 TABLET ORAL at 21:28

## 2019-12-21 RX ADMIN — INSULIN HUMAN SCH: 100 INJECTION, SOLUTION PARENTERAL at 17:24

## 2019-12-21 RX ADMIN — INSULIN HUMAN SCH UNITS: 100 INJECTION, SOLUTION PARENTERAL at 13:54

## 2019-12-21 RX ADMIN — METOCLOPRAMIDE SCH MG: 10 TABLET ORAL at 13:45

## 2019-12-21 RX ADMIN — HYDROMORPHONE HYDROCHLORIDE PRN MG: 2 INJECTION, SOLUTION INTRAMUSCULAR; INTRAVENOUS; SUBCUTANEOUS at 14:10

## 2019-12-21 RX ADMIN — MORPHINE SULFATE SCH MG: 30 TABLET, FILM COATED, EXTENDED RELEASE ORAL at 10:08

## 2019-12-21 RX ADMIN — INSULIN HUMAN SCH: 100 INJECTION, SOLUTION PARENTERAL at 10:02

## 2019-12-21 RX ADMIN — SODIUM CHLORIDE SCH MLS/HR: 0.45 INJECTION, SOLUTION INTRAVENOUS at 05:57

## 2019-12-21 RX ADMIN — METOCLOPRAMIDE SCH MG: 10 TABLET ORAL at 21:27

## 2019-12-21 RX ADMIN — HYDROMORPHONE HYDROCHLORIDE PRN MG: 2 INJECTION, SOLUTION INTRAMUSCULAR; INTRAVENOUS; SUBCUTANEOUS at 09:57

## 2019-12-21 RX ADMIN — LIDOCAINE HYDROCHLORIDE SCH ML: 20 SOLUTION ORAL; TOPICAL at 13:45

## 2019-12-21 RX ADMIN — HYDROMORPHONE HYDROCHLORIDE PRN MG: 2 INJECTION, SOLUTION INTRAMUSCULAR; INTRAVENOUS; SUBCUTANEOUS at 05:57

## 2019-12-21 RX ADMIN — SODIUM CHLORIDE SCH MLS/HR: 0.45 INJECTION, SOLUTION INTRAVENOUS at 13:45

## 2019-12-21 RX ADMIN — GABAPENTIN SCH MG: 300 CAPSULE ORAL at 13:45

## 2019-12-21 RX ADMIN — PANTOPRAZOLE SODIUM SCH MG: 40 TABLET, DELAYED RELEASE ORAL at 10:08

## 2019-12-21 RX ADMIN — GABAPENTIN SCH MG: 300 CAPSULE ORAL at 21:28

## 2019-12-21 RX ADMIN — Medication SCH ML: at 21:29

## 2019-12-21 RX ADMIN — SODIUM CHLORIDE SCH MLS/HR: 0.45 INJECTION, SOLUTION INTRAVENOUS at 21:34

## 2019-12-21 RX ADMIN — METOCLOPRAMIDE SCH MG: 10 TABLET ORAL at 10:12

## 2019-12-21 RX ADMIN — HYDROXYCHLOROQUINE SULFATE SCH MG: 200 TABLET ORAL at 10:08

## 2019-12-21 RX ADMIN — MORPHINE SULFATE SCH MG: 30 TABLET, FILM COATED, EXTENDED RELEASE ORAL at 21:27

## 2019-12-21 RX ADMIN — Medication SCH ML: at 10:09

## 2019-12-21 RX ADMIN — METHYLPREDNISOLONE SODIUM SUCCINATE SCH MG: 40 INJECTION, POWDER, FOR SOLUTION INTRAMUSCULAR; INTRAVENOUS at 14:15

## 2019-12-21 RX ADMIN — METHYLPREDNISOLONE SODIUM SUCCINATE SCH MG: 40 INJECTION, POWDER, FOR SOLUTION INTRAMUSCULAR; INTRAVENOUS at 05:56

## 2019-12-21 RX ADMIN — LIDOCAINE HYDROCHLORIDE SCH ML: 20 SOLUTION ORAL; TOPICAL at 21:28

## 2019-12-21 RX ADMIN — HYDROMORPHONE HYDROCHLORIDE PRN MG: 2 INJECTION, SOLUTION INTRAMUSCULAR; INTRAVENOUS; SUBCUTANEOUS at 01:58

## 2019-12-21 RX ADMIN — LIDOCAINE HYDROCHLORIDE SCH ML: 20 SOLUTION ORAL; TOPICAL at 10:12

## 2019-12-21 NOTE — PROGRESS NOTE
Assessment and Plan





- Patient Problems


(1) Chronic pain syndrome


Current Visit: No   Status: Chronic   


Plan to address problem: 


pain control.








(2) Exacerbation of systemic lupus erythematosus


Current Visit: Yes   Status: Acute   


Plan to address problem: 


pulse steroid use.








(3) Gastroparesis diabeticorum


Current Visit: Yes   Status: Chronic   


Plan to address problem: 


supportive.








(4) Coumadin toxicity


Current Visit: Yes   Status: Acute   


Plan to address problem: 


will reverse.


reversed.








(5) Cachexia


Current Visit: Yes   Status: Acute   


Plan to address problem: 


with eliazar brynat malnurishment, present on admission, and will continue with 

support.








Subjective


Date of service: 12/21/19


Principal diagnosis: Dehydration/generalized weakness,poor nutrition.


Interval history: 


Patient seen/examined, resting in bed, labs reviewed, case d/w patient.Will 

continue with current management.


Patient seen/examined, resting in bed,, still in pain, and weakness.


patient seen/examined, resting in bed, records reviewed, case d/w patient. Ct of

the abd/pelvics with ovarian cystic lesion.patient c/o slight improvement , 

overall.will continue , with current management , for couple more days, and 

hernna down steroid, as she prepares to go home.


Patient seen/examined, resting in bed, records reviewed, case d/w patient. Will 

plan D/c this weekend.


Patient seen/examined, resting in bed, discussed up in chair, PT/OT.labs 

ordered. D/C in place for the next 48hrs, with home health.





Objective





- Constitutional


Vitals: 


                               Vital Signs - 12hr











  12/21/19 12/21/19 12/21/19





  09:58 11:40 16:58


 


Temperature  98.2 F 98.4 F


 


Pulse Rate  74 72


 


Respiratory  16 16





Rate   


 


Blood Pressure 111/76 119/86 112/68


 


O2 Sat by Pulse  95 97





Oximetry   











General appearance: Present: mild distress, well-nourished





- EENT


Eyes: PERRL, EOM intact


ENT: hearing intact, clear oral mucosa


Ears: bilateral: normal





- Neck


Neck: supple, normal ROM





- Respiratory


Respiratory effort: normal


Respiratory: bilateral: CTA





- Breasts


Breasts: deferred





- Cardiovascular


Rhythm: regular


Heart Sounds: Present: S1 & S2.  Absent: gallop, rub


Extremities: pulses intact, No edema, normal color, Full ROM





- Gastrointestinal


General gastrointestinal: Present: soft, non-tender, non-distended, normal bowel

sounds


Rectal Exam: deferred





- Genitourinary


Female genitourinary: deferred





- Integumentary


Integumentary: clear, warm, dry





- Musculoskeletal


Musculoskeletal: 1, strength equal bilaterally





- Neurologic


Neurologic: moves all extremities





- Psychiatric


Psychiatric: memory intact, appropriate mood/affect, intact judgment & insight





- Labs


CBC & Chem 7: 


                                 12/20/19 08:00





                                 12/20/19 08:00


Labs: 


                              Abnormal lab results











  12/20/19 12/21/19 12/21/19 Range/Units





  21:49 06:00 11:25 


 


PT   40.7 H   (12.2-14.9)  Sec.


 


INR   4.09 H   (0.87-1.13)  


 


POC Glucose  138 H   182 H  ()  














  12/21/19 Range/Units





  16:13 


 


PT   (12.2-14.9)  Sec.


 


INR   (0.87-1.13)  


 


POC Glucose  146 H  ()

## 2019-12-22 LAB — INR PPP: 3.52 (ref 0.87–1.13)

## 2019-12-22 RX ADMIN — METOCLOPRAMIDE SCH MG: 10 TABLET ORAL at 14:07

## 2019-12-22 RX ADMIN — METHYLPREDNISOLONE SODIUM SUCCINATE SCH MG: 40 INJECTION, POWDER, FOR SOLUTION INTRAMUSCULAR; INTRAVENOUS at 05:53

## 2019-12-22 RX ADMIN — LIDOCAINE HYDROCHLORIDE SCH ML: 20 SOLUTION ORAL; TOPICAL at 14:07

## 2019-12-22 RX ADMIN — SERTRALINE SCH MG: 100 TABLET, FILM COATED ORAL at 09:50

## 2019-12-22 RX ADMIN — HYDROXYCHLOROQUINE SULFATE SCH MG: 200 TABLET ORAL at 23:21

## 2019-12-22 RX ADMIN — INSULIN HUMAN SCH: 100 INJECTION, SOLUTION PARENTERAL at 07:58

## 2019-12-22 RX ADMIN — LIDOCAINE HYDROCHLORIDE SCH ML: 20 SOLUTION ORAL; TOPICAL at 08:26

## 2019-12-22 RX ADMIN — SODIUM CHLORIDE SCH MLS/HR: 0.45 INJECTION, SOLUTION INTRAVENOUS at 23:22

## 2019-12-22 RX ADMIN — INSULIN HUMAN SCH UNITS: 100 INJECTION, SOLUTION PARENTERAL at 00:20

## 2019-12-22 RX ADMIN — Medication SCH ML: at 09:52

## 2019-12-22 RX ADMIN — FOLIC ACID SCH MG: 1 TABLET ORAL at 09:52

## 2019-12-22 RX ADMIN — GABAPENTIN SCH MG: 300 CAPSULE ORAL at 20:09

## 2019-12-22 RX ADMIN — HYDROMORPHONE HYDROCHLORIDE PRN MG: 2 INJECTION, SOLUTION INTRAMUSCULAR; INTRAVENOUS; SUBCUTANEOUS at 02:55

## 2019-12-22 RX ADMIN — INSULIN HUMAN SCH: 100 INJECTION, SOLUTION PARENTERAL at 12:50

## 2019-12-22 RX ADMIN — INSULIN HUMAN SCH UNITS: 100 INJECTION, SOLUTION PARENTERAL at 23:39

## 2019-12-22 RX ADMIN — METHYLPREDNISOLONE SODIUM SUCCINATE SCH MG: 40 INJECTION, POWDER, FOR SOLUTION INTRAMUSCULAR; INTRAVENOUS at 14:13

## 2019-12-22 RX ADMIN — METHYLPREDNISOLONE SODIUM SUCCINATE SCH MG: 40 INJECTION, POWDER, FOR SOLUTION INTRAMUSCULAR; INTRAVENOUS at 23:38

## 2019-12-22 RX ADMIN — Medication SCH ML: at 23:41

## 2019-12-22 RX ADMIN — HYDROMORPHONE HYDROCHLORIDE PRN MG: 2 INJECTION, SOLUTION INTRAMUSCULAR; INTRAVENOUS; SUBCUTANEOUS at 20:10

## 2019-12-22 RX ADMIN — INSULIN HUMAN SCH: 100 INJECTION, SOLUTION PARENTERAL at 17:14

## 2019-12-22 RX ADMIN — HYDROMORPHONE HYDROCHLORIDE PRN MG: 2 INJECTION, SOLUTION INTRAMUSCULAR; INTRAVENOUS; SUBCUTANEOUS at 11:23

## 2019-12-22 RX ADMIN — SODIUM CHLORIDE SCH MLS/HR: 0.45 INJECTION, SOLUTION INTRAVENOUS at 09:52

## 2019-12-22 RX ADMIN — Medication SCH ML: at 23:21

## 2019-12-22 RX ADMIN — MORPHINE SULFATE SCH MG: 30 TABLET, FILM COATED, EXTENDED RELEASE ORAL at 09:51

## 2019-12-22 RX ADMIN — LIDOCAINE HYDROCHLORIDE SCH ML: 20 SOLUTION ORAL; TOPICAL at 20:09

## 2019-12-22 RX ADMIN — GABAPENTIN SCH MG: 300 CAPSULE ORAL at 14:07

## 2019-12-22 RX ADMIN — METOCLOPRAMIDE SCH MG: 10 TABLET ORAL at 08:26

## 2019-12-22 RX ADMIN — GABAPENTIN SCH MG: 300 CAPSULE ORAL at 08:26

## 2019-12-22 RX ADMIN — PANTOPRAZOLE SODIUM SCH MG: 40 TABLET, DELAYED RELEASE ORAL at 09:51

## 2019-12-22 RX ADMIN — HYDROMORPHONE HYDROCHLORIDE PRN MG: 2 INJECTION, SOLUTION INTRAMUSCULAR; INTRAVENOUS; SUBCUTANEOUS at 07:20

## 2019-12-22 RX ADMIN — METOCLOPRAMIDE SCH MG: 10 TABLET ORAL at 20:09

## 2019-12-22 RX ADMIN — MORPHINE SULFATE SCH MG: 30 TABLET, FILM COATED, EXTENDED RELEASE ORAL at 23:20

## 2019-12-22 RX ADMIN — HYDROXYCHLOROQUINE SULFATE SCH MG: 200 TABLET ORAL at 09:51

## 2019-12-22 RX ADMIN — HYDROMORPHONE HYDROCHLORIDE PRN MG: 2 INJECTION, SOLUTION INTRAMUSCULAR; INTRAVENOUS; SUBCUTANEOUS at 15:55

## 2019-12-22 NOTE — DISCHARGE SUMMARY
Providers





- Providers


Date of Admission: 


12/16/19 13:56





Date of discharge: 12/23/19


Attending physician: 


YOANNA WASHINGTON





                                        





12/21/19 19:57


Physical Therapy Evaluation and Treat [CONS] Routine 


   Comment: up in bed twice daily.


   Reason For Exam: deconditioned.











Primary care physician: 


YOANNA WASHINGTON








Hospitalization


Reason for admission: Dehydration, generalized weakness.


Condition: Fair


Hospital course: 


Patient seen/examined,resting in bed, records reviewed, case d/w patient.C/o 

feels a bit better.Will d/c home tomorrow.





Disposition: DC-01 TO HOME OR SELFCARE





- Discharge Diagnoses


(1) Chronic pain syndrome


Status: Chronic   





(2) Exacerbation of systemic lupus erythematosus


Status: Acute   





(3) Gastroparesis diabeticorum


Status: Chronic   





(4) Coumadin toxicity


Status: Resolved   





(5) Cachexia


Status: Chronic   





Core Measure Documentation





- Palliative Care


Palliative Care/ Comfort Measures: Not Applicable





- Core Measures


Any of the following diagnoses?: history only





Exam





- Constitutional


Vitals: 


                                        











Temp Pulse Resp BP Pulse Ox


 


 98.1 F   75   16   119/76   94 


 


 12/22/19 11:47  12/22/19 11:47  12/22/19 11:47  12/22/19 11:47  12/22/19 11:47











General appearance: Present: well-nourished





- EENT


Eyes: Present: PERRL


ENT: hearing intact, clear oral mucosa





- Neck


Neck: Present: supple, normal ROM





- Respiratory


Respiratory effort: normal


Respiratory: bilateral: CTA





- Cardiovascular


Heart Sounds: Present: S1 & S2.  Absent: rub, click





- Extremities


Extremities: pulses symmetrical, No edema


Peripheral Pulses: within normal limits





- Abdominal


General gastrointestinal: Present: soft, non-tender, non-distended, normal bowel

 sounds


Female genitourinary: Present: deferred





- Rectal


Rectal Exam: deferred





- Integumentary


Integumentary: Present: clear, warm, dry





- Musculoskeletal


Musculoskeletal: gait normal, strength equal bilaterally





- Psychiatric


Psychiatric: appropriate mood/affect, intact judgment & insight





- Neurologic


Neurologic: CNII-XII intact, moves all extremities





Plan


Activity: up only with assistance


Weight Bearing Status: Weight Bear as Tolerated


Diet: regular


Follow up with: 


YOANNA WASHINGTON DO [Primary Care Provider] - 7 Days


Forms:  Warfarin Discharge Instruction

## 2019-12-23 VITALS — DIASTOLIC BLOOD PRESSURE: 91 MMHG | SYSTOLIC BLOOD PRESSURE: 145 MMHG

## 2019-12-23 LAB — INR PPP: 3.06 (ref 0.87–1.13)

## 2019-12-23 RX ADMIN — HYDROXYCHLOROQUINE SULFATE SCH MG: 200 TABLET ORAL at 10:00

## 2019-12-23 RX ADMIN — PANTOPRAZOLE SODIUM SCH MG: 40 TABLET, DELAYED RELEASE ORAL at 10:00

## 2019-12-23 RX ADMIN — Medication SCH ML: at 10:00

## 2019-12-23 RX ADMIN — HYDROMORPHONE HYDROCHLORIDE PRN MG: 2 INJECTION, SOLUTION INTRAMUSCULAR; INTRAVENOUS; SUBCUTANEOUS at 10:00

## 2019-12-23 RX ADMIN — FOLIC ACID SCH MG: 1 TABLET ORAL at 09:59

## 2019-12-23 RX ADMIN — MORPHINE SULFATE SCH: 30 TABLET, FILM COATED, EXTENDED RELEASE ORAL at 11:59

## 2019-12-23 RX ADMIN — INSULIN HUMAN SCH UNITS: 100 INJECTION, SOLUTION PARENTERAL at 09:19

## 2019-12-23 RX ADMIN — METHYLPREDNISOLONE SODIUM SUCCINATE SCH MG: 40 INJECTION, POWDER, FOR SOLUTION INTRAMUSCULAR; INTRAVENOUS at 05:32

## 2019-12-23 RX ADMIN — SERTRALINE SCH MG: 100 TABLET, FILM COATED ORAL at 10:00

## 2019-12-23 RX ADMIN — HYDROMORPHONE HYDROCHLORIDE PRN MG: 2 INJECTION, SOLUTION INTRAMUSCULAR; INTRAVENOUS; SUBCUTANEOUS at 05:32

## 2019-12-23 RX ADMIN — METOCLOPRAMIDE SCH MG: 10 TABLET ORAL at 09:19

## 2019-12-23 RX ADMIN — HYDROMORPHONE HYDROCHLORIDE PRN MG: 2 INJECTION, SOLUTION INTRAMUSCULAR; INTRAVENOUS; SUBCUTANEOUS at 01:31

## 2019-12-23 RX ADMIN — INSULIN HUMAN SCH: 100 INJECTION, SOLUTION PARENTERAL at 12:39

## 2019-12-23 RX ADMIN — GABAPENTIN SCH MG: 300 CAPSULE ORAL at 09:19

## 2019-12-23 RX ADMIN — LIDOCAINE HYDROCHLORIDE SCH ML: 20 SOLUTION ORAL; TOPICAL at 09:19

## 2019-12-23 RX ADMIN — SODIUM CHLORIDE SCH MLS/HR: 0.45 INJECTION, SOLUTION INTRAVENOUS at 07:28
